# Patient Record
Sex: MALE | Race: WHITE | Employment: FULL TIME | URBAN - METROPOLITAN AREA
[De-identification: names, ages, dates, MRNs, and addresses within clinical notes are randomized per-mention and may not be internally consistent; named-entity substitution may affect disease eponyms.]

---

## 2017-07-09 ENCOUNTER — HOSPITAL ENCOUNTER (OUTPATIENT)
Age: 37
Discharge: HOME | End: 2017-07-09
Payer: COMMERCIAL

## 2017-07-09 VITALS
RESPIRATION RATE: 18 BRPM | DIASTOLIC BLOOD PRESSURE: 50 MMHG | HEART RATE: 63 BPM | SYSTOLIC BLOOD PRESSURE: 90 MMHG | OXYGEN SATURATION: 98 % | TEMPERATURE: 98 F

## 2017-07-09 DIAGNOSIS — H00.019 HORDEOLUM EXTERNUM, UNSPECIFIED LATERALITY: Primary | ICD-10-CM

## 2017-07-09 PROCEDURE — 99204 OFFICE O/P NEW MOD 45 MIN: CPT

## 2017-07-09 PROCEDURE — 99203 OFFICE O/P NEW LOW 30 MIN: CPT

## 2017-07-09 RX ORDER — CEPHALEXIN 500 MG/1
500 CAPSULE ORAL 4 TIMES DAILY
Qty: 28 CAPSULE | Refills: 0 | Status: SHIPPED | OUTPATIENT
Start: 2017-07-09 | End: 2017-07-19

## 2017-07-09 RX ORDER — ERYTHROMYCIN 5 MG/G
1 OINTMENT OPHTHALMIC EVERY 6 HOURS
Qty: 1 G | Refills: 0 | Status: SHIPPED | OUTPATIENT
Start: 2017-07-09 | End: 2017-07-16

## 2017-07-09 NOTE — ED INITIAL ASSESSMENT (HPI)
C/O BILATERAL EYE \"sTYES\"  LEFT UPPER EYELID  X  1 MONTH AND RIGHT UPPER EYELID X 1 DAY. DENIES FEVERS. DENIES INTERMITTENT DRAINAGE FROM LEFT EYE.

## 2017-07-09 NOTE — ED PROVIDER NOTES
Patient presents with: Eye Visual Problem (opthalmic)      HPI:     Joan Dickey is a 40year old male who presents today with a chief complaint of multiple styes to both eyelids for the past month. The patient states he has been doing warm compresses. bilateral, fundi benign, MISAEL, EOMI, Conjunctiva inflamed: No, bilaterally. No drainage from the bilateral eyes. There are multiple styes to both eyelids. The right eyelid appears to be erythematous surrounding the size.   There is no pain with extraocu